# Patient Record
Sex: MALE
[De-identification: names, ages, dates, MRNs, and addresses within clinical notes are randomized per-mention and may not be internally consistent; named-entity substitution may affect disease eponyms.]

---

## 2020-02-17 VITALS — SYSTOLIC BLOOD PRESSURE: 142 MMHG | DIASTOLIC BLOOD PRESSURE: 83 MMHG

## 2020-02-17 LAB
BASOPHILS NFR BLD AUTO: 0.6 % (ref 0–5)
BUN SERPL-MCNC: 23 MG/DL (ref 7–18)
CHLORIDE SERPL-SCNC: 102 MMOL/L (ref 101–111)
CO2 SERPL-SCNC: 32 MMOL/L (ref 21–32)
CREAT SERPL-MCNC: 1.2 MG/DL (ref 0.5–1.5)
EOSINOPHIL NFR BLD AUTO: 2.3 % (ref 0–8)
ERYTHROCYTE [DISTWIDTH] IN BLOOD BY AUTOMATED COUNT: 14 % (ref 11–15.5)
GFR SERPL CREATININE-BSD FRML MDRD: 66 ML/MIN (ref 60–?)
GLUCOSE SERPL-MCNC: 98 MG/DL (ref 70–105)
HCT VFR BLD AUTO: 54.6 % (ref 42–54)
LYMPHOCYTES NFR SPEC AUTO: 20 % (ref 21–51)
MCH RBC QN AUTO: 29.1 PG (ref 27–33)
MCHC RBC AUTO-ENTMCNC: 33.2 G/DL (ref 32–36)
MCV RBC AUTO: 87.8 FL (ref 79–99)
MONOCYTES NFR BLD AUTO: 8.7 % (ref 3–13)
NEUTROPHILS NFR BLD AUTO: 67.1 % (ref 40–77)
NRBC BLD MANUAL-RTO: 0 % (ref 0–0.19)
PLATELET # BLD AUTO: 217 K/UL (ref 130–400)
POTASSIUM SERPL-SCNC: 4 MMOL/L (ref 3.5–5.1)
RBC # BLD AUTO: 6.22 MIL/UL (ref 4.5–6.2)
SODIUM SERPL-SCNC: 139 MMOL/L (ref 136–145)
WBC # BLD AUTO: 9.8 K/UL (ref 4.8–10.8)

## 2020-02-18 RX ADMIN — CLINDAMYCIN PHOSPHATE SCH MLS/HR: 18 INJECTION, SOLUTION INTRAVENOUS at 09:15

## 2020-02-18 NOTE — NUR
H/H

H/H 18.1/54.6 REPORTED TO DR. KAMINSKI. NO FURTHER ORDERS GIVEN, MAY PROCEED WITH PLANNED 
PROCEDURE.

## 2020-02-19 ENCOUNTER — HOSPITAL ENCOUNTER (OUTPATIENT)
Dept: HOSPITAL 90 - DAH | Age: 60
Discharge: HOME | End: 2020-02-19
Attending: ORTHOPAEDIC SURGERY
Payer: MEDICARE

## 2020-02-19 VITALS — DIASTOLIC BLOOD PRESSURE: 70 MMHG | SYSTOLIC BLOOD PRESSURE: 140 MMHG

## 2020-02-19 VITALS — SYSTOLIC BLOOD PRESSURE: 150 MMHG | DIASTOLIC BLOOD PRESSURE: 74 MMHG

## 2020-02-19 VITALS — SYSTOLIC BLOOD PRESSURE: 141 MMHG | DIASTOLIC BLOOD PRESSURE: 81 MMHG

## 2020-02-19 VITALS — DIASTOLIC BLOOD PRESSURE: 77 MMHG | SYSTOLIC BLOOD PRESSURE: 140 MMHG

## 2020-02-19 VITALS — SYSTOLIC BLOOD PRESSURE: 139 MMHG | DIASTOLIC BLOOD PRESSURE: 74 MMHG

## 2020-02-19 VITALS — DIASTOLIC BLOOD PRESSURE: 68 MMHG | SYSTOLIC BLOOD PRESSURE: 142 MMHG

## 2020-02-19 VITALS — SYSTOLIC BLOOD PRESSURE: 140 MMHG | DIASTOLIC BLOOD PRESSURE: 81 MMHG

## 2020-02-19 VITALS — DIASTOLIC BLOOD PRESSURE: 84 MMHG | SYSTOLIC BLOOD PRESSURE: 138 MMHG

## 2020-02-19 VITALS — DIASTOLIC BLOOD PRESSURE: 69 MMHG | SYSTOLIC BLOOD PRESSURE: 130 MMHG

## 2020-02-19 VITALS — SYSTOLIC BLOOD PRESSURE: 144 MMHG | DIASTOLIC BLOOD PRESSURE: 79 MMHG

## 2020-02-19 VITALS — SYSTOLIC BLOOD PRESSURE: 131 MMHG | DIASTOLIC BLOOD PRESSURE: 68 MMHG

## 2020-02-19 VITALS — DIASTOLIC BLOOD PRESSURE: 72 MMHG | SYSTOLIC BLOOD PRESSURE: 121 MMHG

## 2020-02-19 VITALS — SYSTOLIC BLOOD PRESSURE: 138 MMHG | DIASTOLIC BLOOD PRESSURE: 81 MMHG

## 2020-02-19 VITALS — DIASTOLIC BLOOD PRESSURE: 82 MMHG | SYSTOLIC BLOOD PRESSURE: 150 MMHG

## 2020-02-19 VITALS — DIASTOLIC BLOOD PRESSURE: 76 MMHG | SYSTOLIC BLOOD PRESSURE: 133 MMHG

## 2020-02-19 VITALS — DIASTOLIC BLOOD PRESSURE: 67 MMHG | SYSTOLIC BLOOD PRESSURE: 141 MMHG

## 2020-02-19 VITALS — DIASTOLIC BLOOD PRESSURE: 76 MMHG | SYSTOLIC BLOOD PRESSURE: 142 MMHG

## 2020-02-19 VITALS — BODY MASS INDEX: 44.59 KG/M2 | HEIGHT: 70.5 IN | WEIGHT: 315 LBS

## 2020-02-19 DIAGNOSIS — M17.11: ICD-10-CM

## 2020-02-19 DIAGNOSIS — E88.81: ICD-10-CM

## 2020-02-19 DIAGNOSIS — Z83.3: ICD-10-CM

## 2020-02-19 DIAGNOSIS — M94.261: ICD-10-CM

## 2020-02-19 DIAGNOSIS — Z98.890: ICD-10-CM

## 2020-02-19 DIAGNOSIS — E66.01: ICD-10-CM

## 2020-02-19 DIAGNOSIS — E78.5: ICD-10-CM

## 2020-02-19 DIAGNOSIS — I10: ICD-10-CM

## 2020-02-19 DIAGNOSIS — Z88.8: ICD-10-CM

## 2020-02-19 DIAGNOSIS — Z82.49: ICD-10-CM

## 2020-02-19 DIAGNOSIS — Z96.652: ICD-10-CM

## 2020-02-19 DIAGNOSIS — G89.29: ICD-10-CM

## 2020-02-19 DIAGNOSIS — M25.761: ICD-10-CM

## 2020-02-19 DIAGNOSIS — Z79.899: ICD-10-CM

## 2020-02-19 DIAGNOSIS — Z88.0: ICD-10-CM

## 2020-02-19 DIAGNOSIS — M25.561: ICD-10-CM

## 2020-02-19 DIAGNOSIS — F32.9: ICD-10-CM

## 2020-02-19 DIAGNOSIS — Z99.89: ICD-10-CM

## 2020-02-19 DIAGNOSIS — M23.221: Primary | ICD-10-CM

## 2020-02-19 DIAGNOSIS — Z91.018: ICD-10-CM

## 2020-02-19 DIAGNOSIS — G47.33: ICD-10-CM

## 2020-02-19 PROCEDURE — 85025 COMPLETE CBC W/AUTO DIFF WBC: CPT

## 2020-02-19 PROCEDURE — 36415 COLL VENOUS BLD VENIPUNCTURE: CPT

## 2020-02-19 PROCEDURE — 29881 ARTHRS KNE SRG MNISECTMY M/L: CPT

## 2020-02-19 PROCEDURE — 80048 BASIC METABOLIC PNL TOTAL CA: CPT

## 2020-02-19 RX ADMIN — CLINDAMYCIN PHOSPHATE SCH: 18 INJECTION, SOLUTION INTRAVENOUS at 08:30

## 2020-02-19 NOTE — NUR
PT LEFT VIA WHEELCHAIR WITH RX SCRIPT GIVEN WIFE ALONG WITH F/U APPT AND D/C INSTRUCTIONS.  
V/S STABLE NO COMPLICATIONS,CRUTCHES GIVEN WITH INSTRUCTIONS.

## 2024-11-13 ENCOUNTER — HOSPITAL ENCOUNTER (OUTPATIENT)
Dept: HOSPITAL 90 - RAH | Age: 64
Discharge: HOME | End: 2024-11-13
Attending: FAMILY MEDICINE
Payer: MEDICARE

## 2024-11-13 DIAGNOSIS — I35.8: Primary | ICD-10-CM

## 2024-11-13 DIAGNOSIS — I51.7: ICD-10-CM

## 2024-11-13 PROCEDURE — 93306 TTE W/DOPPLER COMPLETE: CPT

## 2024-12-03 ENCOUNTER — HOSPITAL ENCOUNTER (OUTPATIENT)
Dept: HOSPITAL 90 - RAH | Age: 64
Discharge: HOME | End: 2024-12-03
Attending: INTERNAL MEDICINE
Payer: COMMERCIAL

## 2024-12-03 DIAGNOSIS — Z13.6: Primary | ICD-10-CM

## 2024-12-03 PROCEDURE — 75571 CT HRT W/O DYE W/CA TEST: CPT

## 2024-12-03 NOTE — [facility]
CT HEART SAVER PROMOTIONAL



HISTORY: Calcium scoring



COMPARISON: None



TECHNIQUE:

Computed tomography of the heart was performed with ECG gating and

suspended respiration.  Postprocessing was performed on a computer

workstation to obtain diastolic phase images, determine calcium score

and provide a quantitative assessment of extent of disease.  This CT

included only the heart. 



HeartSaver score is 1292.10.  Please see cardiac calcium score report.



The available CT chest images show no acute finding.











CT was performed with one or more following dose reduction techniques:

automated exposure control, adjustment of the mA and kv according to

patient's size, or use of a iterative reconstruction technique.

## 2025-01-16 ENCOUNTER — HOSPITAL ENCOUNTER (OUTPATIENT)
Dept: HOSPITAL 90 - SHCH | Age: 65
Discharge: HOME | End: 2025-01-16
Attending: INTERNAL MEDICINE
Payer: MEDICARE

## 2025-01-16 DIAGNOSIS — E11.9: ICD-10-CM

## 2025-01-16 DIAGNOSIS — R06.09: ICD-10-CM

## 2025-01-16 DIAGNOSIS — I45.10: Primary | ICD-10-CM

## 2025-01-16 PROCEDURE — 78452 HT MUSCLE IMAGE SPECT MULT: CPT

## 2025-01-16 PROCEDURE — 93017 CV STRESS TEST TRACING ONLY: CPT

## 2025-01-16 RX ADMIN — REGADENOSON ONE MG: 0.08 INJECTION, SOLUTION INTRAVENOUS at 15:09
